# Patient Record
Sex: FEMALE | Race: WHITE
[De-identification: names, ages, dates, MRNs, and addresses within clinical notes are randomized per-mention and may not be internally consistent; named-entity substitution may affect disease eponyms.]

---

## 2018-04-13 ENCOUNTER — HOSPITAL ENCOUNTER (OUTPATIENT)
Dept: HOSPITAL 92 - RAD | Age: 43
Discharge: HOME | End: 2018-04-13
Attending: OTOLARYNGOLOGY
Payer: COMMERCIAL

## 2018-04-13 DIAGNOSIS — R05: Primary | ICD-10-CM

## 2018-04-13 PROCEDURE — 71046 X-RAY EXAM CHEST 2 VIEWS: CPT

## 2018-04-13 NOTE — RAD
PA AND LATERAL CHEST:

 

History: Cough. 

 

FINDINGS: 

The heart size is normal. The lungs are well expanded and clear. The bony thorax is unremarkable. 

 

IMPRESSION: 

Normal exam. 

 

POS: OFF

## 2018-04-23 ENCOUNTER — HOSPITAL ENCOUNTER (OUTPATIENT)
Dept: HOSPITAL 92 - BICMAMMO | Age: 43
Discharge: HOME | End: 2018-04-23
Attending: OBSTETRICS & GYNECOLOGY
Payer: COMMERCIAL

## 2018-04-23 DIAGNOSIS — N63.20: ICD-10-CM

## 2018-04-23 DIAGNOSIS — Z12.31: Primary | ICD-10-CM

## 2018-04-23 PROCEDURE — 77067 SCR MAMMO BI INCL CAD: CPT

## 2018-04-23 PROCEDURE — 77063 BREAST TOMOSYNTHESIS BI: CPT
